# Patient Record
Sex: FEMALE | Race: WHITE | NOT HISPANIC OR LATINO | Employment: UNEMPLOYED | URBAN - NONMETROPOLITAN AREA
[De-identification: names, ages, dates, MRNs, and addresses within clinical notes are randomized per-mention and may not be internally consistent; named-entity substitution may affect disease eponyms.]

---

## 2020-11-11 ENCOUNTER — INITIAL PRENATAL (OUTPATIENT)
Dept: OBSTETRICS AND GYNECOLOGY | Facility: CLINIC | Age: 30
End: 2020-11-11

## 2020-11-11 ENCOUNTER — LAB (OUTPATIENT)
Dept: LAB | Facility: HOSPITAL | Age: 30
End: 2020-11-11

## 2020-11-11 VITALS
WEIGHT: 106 LBS | DIASTOLIC BLOOD PRESSURE: 56 MMHG | SYSTOLIC BLOOD PRESSURE: 102 MMHG | BODY MASS INDEX: 19.51 KG/M2 | HEIGHT: 62 IN

## 2020-11-11 DIAGNOSIS — Z32.00 PREGNANCY EXAMINATION OR TEST, PREGNANCY UNCONFIRMED: ICD-10-CM

## 2020-11-11 DIAGNOSIS — F11.29 OPIOID DEPENDENCE WITH OPIOID-INDUCED DISORDER (HCC): ICD-10-CM

## 2020-11-11 DIAGNOSIS — O99.331 MATERNAL TOBACCO USE IN FIRST TRIMESTER: ICD-10-CM

## 2020-11-11 DIAGNOSIS — O09.91 HIGH-RISK PREGNANCY IN FIRST TRIMESTER: Primary | ICD-10-CM

## 2020-11-11 DIAGNOSIS — Z36.87 ENCOUNTER FOR ANTENATAL SCREENING FOR UNCERTAIN DATES: ICD-10-CM

## 2020-11-11 DIAGNOSIS — Z82.79 FAMILY HISTORY OF CONGENITAL HEART DEFECT: ICD-10-CM

## 2020-11-11 DIAGNOSIS — O21.9 NAUSEA AND VOMITING DURING PREGNANCY PRIOR TO 22 WEEKS GESTATION: ICD-10-CM

## 2020-11-11 DIAGNOSIS — Z34.80 SUPERVISION OF OTHER NORMAL PREGNANCY: Primary | ICD-10-CM

## 2020-11-11 LAB
ABO GROUP BLD: NORMAL
AMPHET+METHAMPHET UR QL: NEGATIVE
AMPHETAMINES UR QL: NEGATIVE
ANTI-LEA: NORMAL
B-HCG UR QL: POSITIVE
BARBITURATES UR QL SCN: NEGATIVE
BASOPHILS # BLD AUTO: 0.06 10*3/MM3 (ref 0–0.2)
BASOPHILS NFR BLD AUTO: 0.6 % (ref 0–1.5)
BENZODIAZ UR QL SCN: NEGATIVE
BILIRUB UR QL STRIP: NEGATIVE
BLD GP AB SCN SERPL QL: POSITIVE
BUPRENORPHINE SERPL-MCNC: NEGATIVE NG/ML
CANNABINOIDS SERPL QL: POSITIVE
CLARITY UR: ABNORMAL
COCAINE UR QL: NEGATIVE
COLOR UR: YELLOW
DEPRECATED RDW RBC AUTO: 45.2 FL (ref 37–54)
EOSINOPHIL # BLD AUTO: 0.35 10*3/MM3 (ref 0–0.4)
EOSINOPHIL NFR BLD AUTO: 3.4 % (ref 0.3–6.2)
ERYTHROCYTE [DISTWIDTH] IN BLOOD BY AUTOMATED COUNT: 13.6 % (ref 12.3–15.4)
GLUCOSE UR STRIP-MCNC: NEGATIVE MG/DL
HBV SURFACE AG SERPL QL IA: NORMAL
HCT VFR BLD AUTO: 36.2 % (ref 34–46.6)
HCV AB SER DONR QL: REACTIVE
HGB BLD-MCNC: 12.4 G/DL (ref 12–15.9)
HGB UR QL STRIP.AUTO: NEGATIVE
HIV1+2 AB SER QL: NORMAL
IMM GRANULOCYTES # BLD AUTO: 0.04 10*3/MM3 (ref 0–0.05)
IMM GRANULOCYTES NFR BLD AUTO: 0.4 % (ref 0–0.5)
INTERNAL NEGATIVE CONTROL: NEGATIVE
INTERNAL POSITIVE CONTROL: POSITIVE
KETONES UR QL STRIP: NEGATIVE
LEUKOCYTE ESTERASE UR QL STRIP.AUTO: ABNORMAL
LYMPHOCYTES # BLD AUTO: 1.19 10*3/MM3 (ref 0.7–3.1)
LYMPHOCYTES NFR BLD AUTO: 11.7 % (ref 19.6–45.3)
Lab: ABNORMAL
Lab: NORMAL
MCH RBC QN AUTO: 30.9 PG (ref 26.6–33)
MCHC RBC AUTO-ENTMCNC: 34.3 G/DL (ref 31.5–35.7)
MCV RBC AUTO: 90.3 FL (ref 79–97)
METHADONE UR QL SCN: NEGATIVE
MONOCYTES # BLD AUTO: 0.85 10*3/MM3 (ref 0.1–0.9)
MONOCYTES NFR BLD AUTO: 8.4 % (ref 5–12)
NEUTROPHILS NFR BLD AUTO: 7.67 10*3/MM3 (ref 1.7–7)
NEUTROPHILS NFR BLD AUTO: 75.5 % (ref 42.7–76)
NITRITE UR QL STRIP: NEGATIVE
NRBC BLD AUTO-RTO: 0 /100 WBC (ref 0–0.2)
OPIATES UR QL: POSITIVE
OXYCODONE UR QL SCN: NEGATIVE
PCP UR QL SCN: NEGATIVE
PH UR STRIP.AUTO: 6.5 [PH] (ref 5–8)
PLATELET # BLD AUTO: 312 10*3/MM3 (ref 140–450)
PMV BLD AUTO: 10.3 FL (ref 6–12)
PROPOXYPH UR QL: NEGATIVE
PROT UR QL STRIP: NEGATIVE
RBC # BLD AUTO: 4.01 10*6/MM3 (ref 3.77–5.28)
RH BLD: POSITIVE
RPR SER QL: NORMAL
RUBV IGG SERPL IA-ACNC: NORMAL
SP GR UR STRIP: 1.01 (ref 1–1.03)
TRICYCLICS UR QL SCN: NEGATIVE
UROBILINOGEN UR QL STRIP: ABNORMAL
WBC # BLD AUTO: 10.16 10*3/MM3 (ref 3.4–10.8)

## 2020-11-11 PROCEDURE — 87491 CHLMYD TRACH DNA AMP PROBE: CPT | Performed by: NURSE PRACTITIONER

## 2020-11-11 PROCEDURE — 81003 URINALYSIS AUTO W/O SCOPE: CPT | Performed by: NURSE PRACTITIONER

## 2020-11-11 PROCEDURE — 86803 HEPATITIS C AB TEST: CPT | Performed by: NURSE PRACTITIONER

## 2020-11-11 PROCEDURE — 80081 OBSTETRIC PANEL INC HIV TSTG: CPT | Performed by: NURSE PRACTITIONER

## 2020-11-11 PROCEDURE — 87661 TRICHOMONAS VAGINALIS AMPLIF: CPT | Performed by: NURSE PRACTITIONER

## 2020-11-11 PROCEDURE — 86870 RBC ANTIBODY IDENTIFICATION: CPT | Performed by: NURSE PRACTITIONER

## 2020-11-11 PROCEDURE — 80306 DRUG TEST PRSMV INSTRMNT: CPT | Performed by: NURSE PRACTITIONER

## 2020-11-11 PROCEDURE — 87086 URINE CULTURE/COLONY COUNT: CPT | Performed by: NURSE PRACTITIONER

## 2020-11-11 PROCEDURE — 87522 HEPATITIS C REVRS TRNSCRPJ: CPT | Performed by: NURSE PRACTITIONER

## 2020-11-11 PROCEDURE — 99203 OFFICE O/P NEW LOW 30 MIN: CPT | Performed by: NURSE PRACTITIONER

## 2020-11-11 PROCEDURE — 81025 URINE PREGNANCY TEST: CPT | Performed by: NURSE PRACTITIONER

## 2020-11-11 PROCEDURE — 36415 COLL VENOUS BLD VENIPUNCTURE: CPT | Performed by: NURSE PRACTITIONER

## 2020-11-11 PROCEDURE — 87591 N.GONORRHOEAE DNA AMP PROB: CPT | Performed by: NURSE PRACTITIONER

## 2020-11-11 RX ORDER — PRENATAL VIT NO.126/IRON/FOLIC 28MG-0.8MG
1 TABLET ORAL DAILY
COMMUNITY

## 2020-11-11 RX ORDER — METOCLOPRAMIDE 10 MG/1
10 TABLET ORAL EVERY 6 HOURS PRN
Qty: 30 TABLET | Refills: 6 | Status: SHIPPED | OUTPATIENT
Start: 2020-11-11

## 2020-11-11 NOTE — PROGRESS NOTES
I spent approximately 60 minutes with the patient acquiring the health and history intake and discussing topics related to healthy lifestyle. Her LMP is approximately within the 1st week of September, but the patient is unsure. This is her 2nd pregnancy. She had a vaginal delivery in  at [facility]. She says she delivered about 32wks gestation. The baby was born with hypoplastic heart syndrome. She says her son had a couple of surgeries but then  at 4 months of age. She signed a release for us to get the records. A newob bag is given. The 1st trimester teaching was done with the patient. We discussed a healthy diet and exercise and what is recommended. I also discussed Listeriosis and Toxoplasmosis and what fish to avoid due to high mercury levels. I informed patient not to be in hot tubs, saunas, or tanning beds. We discussed that spotting may occur after intercourse which is common, but if heavy bleeding like a period occurs to call [facility] or hospital if clinic is closed.  I encouraged her to make an appointment with the dentist if she has not had a dental exam and cleaning in the last 6 months.  I instructed the patient that alcohol, illicit drug use, and tobacco smoking are not recommended in pregnancy. She says she is now smoking 1/2 pack of cigarettes a day and an E-cig. I encouraged her to quit smoking and not to be around secondhand smoke. She admits to an opiate addiction and wants to talk to the provider about that today. I gave her information for [facility] in [place]. I also told her that [facility] is located in [place]. She wants help with her addicion. She would like to breastfeed if she can.  I gave her pamphlet on breastfeeding classes and the breastfeeding mothers support group. These services are provided by [employer], Lactation Consultant. She filled out the health department referral form and depression screening questionnaire. She says she has a history of depression  and anxiety. She says she has seen a therapist at [facility] in the past. Since becoming pregnant, she says her depression and anxiety have gotten worse. I encouraged her to call and make an appointment at [facility]. She has a lot of anxiety since this is her second pregnancy and her baby  of heart problem. She is worried that this baby will have problems also. She is also worried because she is pregnant and has a drug addiction. Again, I encouraged her to call and make an appointment with a therapist.  I told the patient that group prenatal education classes are offered here. I instructed patient to let the provider know if she would like to join a group after EDC is established. A Centering Pregnancy pamphlet is given. I encouraged the patient to get the TDAP vaccine in the 3rd trimester.  I discussed with the patient that a pediatrician needs to be chosen prior to delivery for the infant to have an appointment scheduled before leaving the hospital.  I discussed lab tests will be done today. All questions were answered at this time. She is seeing Gracie REARDON.

## 2020-11-12 DIAGNOSIS — B19.20: Primary | ICD-10-CM

## 2020-11-12 PROBLEM — O09.91 HIGH-RISK PREGNANCY IN FIRST TRIMESTER: Status: ACTIVE | Noted: 2020-11-12

## 2020-11-12 PROBLEM — O99.331 MATERNAL TOBACCO USE IN FIRST TRIMESTER: Status: ACTIVE | Noted: 2020-11-12

## 2020-11-12 PROBLEM — O21.9 NAUSEA AND VOMITING DURING PREGNANCY PRIOR TO 22 WEEKS GESTATION: Status: ACTIVE | Noted: 2020-11-12

## 2020-11-12 PROBLEM — F11.29 OPIOID DEPENDENCE WITH OPIOID-INDUCED DISORDER (HCC): Status: ACTIVE | Noted: 2020-11-12

## 2020-11-12 PROBLEM — Z82.79 FAMILY HISTORY OF CONGENITAL HEART DEFECT: Status: ACTIVE | Noted: 2020-11-12

## 2020-11-12 LAB — BACTERIA SPEC AEROBE CULT: NO GROWTH

## 2020-11-12 NOTE — ASSESSMENT & PLAN NOTE
2010 Son was born at 32 weeks with hypoplastic left heart diagnosed at birth, had surgery and treatment, lived for 4 months

## 2020-11-12 NOTE — ASSESSMENT & PLAN NOTE
Pt reports she is currently taking dilaudid regularly.  She is interested in [employer].  Pt will call and make appointment with [employer].

## 2020-11-12 NOTE — PROGRESS NOTES
[facility]  Obstetrics Visit    CHIEF COMPLAINT:  New prenatal visit    HISTORY OF PRESENT ILLNESS:  Ana M He is a 30 y.o. y/o  at Unknown by LMP (No LMP recorded (lmp unknown). Patient is pregnant.).  This was not a planned pregnancy and the patient is supported by her significant other.  Reports frequent nausea with occasional vomiting.  Reports breast tenderness.  She denies any vaginal bleeding.  She has started taking a prenatal vitamin.    EPDS: 9    REVIEW OF SYSTEMS  Review of Systems   Constitutional: Positive for fatigue. Negative for activity change, appetite change, chills, diaphoresis, fever, unexpected weight gain and unexpected weight loss.   Respiratory: Negative for chest tightness and shortness of breath.    Cardiovascular: Negative for chest pain and palpitations.   Gastrointestinal: Positive for nausea and vomiting. Negative for abdominal distention, abdominal pain, anal bleeding, blood in stool, constipation, diarrhea, rectal pain, GERD and indigestion.   Genitourinary: Positive for frequency. Negative for amenorrhea, breast discharge, breast lump, breast pain, decreased libido, decreased urine volume, difficulty urinating, dyspareunia, dysuria, flank pain, genital sores, hematuria, pelvic pain, pelvic pressure, urgency, urinary incontinence, vaginal bleeding, vaginal discharge and vaginal pain.   Musculoskeletal: Negative for myalgias.   Skin: Negative for color change, dry skin and skin lesions.   Neurological: Positive for headache. Negative for light-headedness.   Psychiatric/Behavioral: Negative for agitation, dysphoric mood, sleep disturbance, suicidal ideas, depressed mood and stress. The patient is not nervous/anxious.        PRENATAL RISK FACTORS   Problems (from 20 to present)     Problem Noted Resolved    High-risk pregnancy in first trimester 2020 by Gracie Licona APRN No    Nausea and vomiting during pregnancy  "prior to 22 weeks gestation 2020 by Gracie Licona APRN No    Opioid dependence with opioid-induced disorder (CMS/HCC) 2020 by Gracie Licona APRN No    Maternal tobacco use in first trimester 2020 by Gracie Licona APRN No    Family history of congenital heart defect 2020 by Gracie Licona APRN No          DATING CRITERIA:  LMP \"around first week of September\"      OBSTETRIC HISTORY:  OB History    Para Term  AB Living   2 1   1       SAB TAB Ectopic Molar Multiple Live Births             1      # Outcome Date GA Lbr Sha/2nd Weight Sex Delivery Anes PTL Lv   2 Current            1  08   1871 g (4 lb 2 oz) M Vaginal Only EPI  DEC      Birth Comments: Baby born with hypoplastic heart syndrome;  at 4 months of age     GYN HISTORY:  Denies h/o sexually transmitted infections/pelvic inflammatory disease  Denies h/o abnormal pap smears  Last pap smear in 2018 at [facility], normal according to pt.  Release faxed.    Last pap smear:   Last Completed Pap Smear       Status Date      PAP SMEAR Done 9/10/2004 CONVERTED (HISTORICAL) GYN CYTOLOGY        Denies h/o gynecologic surgeries, including biopsies of the cervix    PAST MEDICAL HISTORY:  Past Medical History:   Diagnosis Date   • Anxiety    • Depression    • Substance abuse (CMS/HCC)     opiates     PAST SURGICAL HISTORY:  Past Surgical History:   Procedure Laterality Date   • WISDOM TOOTH EXTRACTION       FAMILY HISTORY:  Family History   Problem Relation Age of Onset   • Congenital heart disease Son    • Diabetes Father    • No Known Problems Mother    • No Known Problems Brother    • No Known Problems Sister    • No Known Problems Maternal Grandmother    • Lung cancer Maternal Grandfather    • No Known Problems Sister      SOCIAL HISTORY:  Social History     Socioeconomic History   • Marital status: Single     Spouse name: Not on file "   • Number of children: Not on file   • Years of education: Not on file   • Highest education level: Not on file   Tobacco Use   • Smoking status: Current Every Day Smoker     Packs/day: 0.50     Types: Cigarettes   • Smokeless tobacco: Never Used   • Tobacco comment: trying to quit   Substance and Sexual Activity   • Alcohol use: Not Currently   • Drug use: Yes     Comment: opiate addiction   • Sexual activity: Yes     Partners: Male     Comment[facility]     GENETIC SCREENING:  Age >34 yo as of BOB: no  Thalassemia: no  NTD: no  CHD: yes, son hypoplastic left heart   Down Syndrome/MR/Fragile X/Autism: no  Ashkenazi Amish with Charbel-Sachs, [place], familial dysautonomia: no  Sickle cell disease or trait: no  Hemophilia: no  Muscular dystrophy: no  Cystic fibrosis: no  Wallowa's chorea: no  Birth defects: no  Genetic/chromosomal disorders: no    INFECTION HISTORY:  TB exposure: no  HSV: no  Illness since LMP: no  Prior GBS infected child: no  STIs: no    ALLERGIES:  Allergies   Allergen Reactions   • Aspirin Hives, Shortness Of Breath and Swelling   • Latex Shortness Of Breath and Rash   • Sulfamethoxazole-Trimethoprim Anaphylaxis       MEDICATIONS:  Prior to Admission medications    Medication Sig Start Date End Date Taking? Authorizing Provider   IRON, FERROUS SULFATE, PO Take  by mouth.    Provider, MD Senait   metoclopramide (Reglan) 10 MG tablet Take 1 tablet by mouth Every 6 (Six) Hours As Needed (nausea and vomitting). 11/11/20   Gracie Licona APRN   prenatal vitamin (prenatal, CLASSIC, vitamin) tablet Take 1 tablet by mouth Daily.    Provider, MD Senait       PHYSICAL EXAM:   LMP  (LMP Unknown)   General: Alert, healthy, no distress, well nourished and well developed.  Neurologic: Alert, oriented to person, place, and time.  Gait normal.  Cranial nerves II-XII grossly intact.  HEENT: Normocephalic, atraumatic.  Extraocular muscles intact,  pupils equal and reactive x2.    Teeth: Normal hygiene.  Neck: Supple, no adenopathy, thyroid normal size, non-tender, without nodularity, trachea midline.  Breasts: No masses, skin dimpling, skin retraction, nipple discharge, or asymmetry bilaterally.  Lungs: Normal respiratory effort.  Clear to auscultation bilaterally.  No wheezes, rhonci, or rales.  Heart: Regular rate and rhythm.  No murmer, rub or gallop.  Abdomen: Soft, non-tender, non-distended,no masses, no hepatosplenomegaly, no hernia.  Skin: No rash, no lesions.  Extremities: No cyanosis, clubbing or edema.    Bedside ultrasound performed by myself which shows the findings below: +HHUS      IMPRESSION:  Ana M He is a 30 y.o.  at Unknown for a new prenatal visit.    PLAN:  1. NOB  - Options counseling performed and patient desires continuation of pregnancy to term   - Prenatal labs ordered  - Genetic testing, including cystic fibrosis, was discussed and patient desires DS screening with gender  - Continue prenatal vitamins  - Weight gain counseling performed.   - Pregravid BMI 18.5-24.9: Recommend 25-35 lb  - Return to clinic in 4 weeks for return prenatal visit and dating scan   - Reviewed COVID-19 visitation policy  - Reviewed COVID-19 precautions     Diagnosis Plan   1. High-risk pregnancy in first trimester     2. Nausea and vomiting during pregnancy prior to 22 weeks gestation  metoclopramide (Reglan) 10 MG tablet   3. Maternal tobacco use in first trimester  Educated on risks of smoking in pregnancy, strongly encouraged complete smoking cessation   4. Opioid dependence with opioid-induced disorder (CMS/HCC)  Discussed risks[facility]    5. Family history of congenital heart defect     6. Encounter for  screening for uncertain dates  US Ob < 14 Weeks Single or First Gestation     ALEXYS Rodriguez  2020  09:10 CST

## 2020-11-13 LAB
C TRACH RRNA SPEC QL NAA+PROBE: NEGATIVE
N GONORRHOEA RRNA SPEC QL NAA+PROBE: NEGATIVE
T VAGINALIS DNA SPEC QL NAA+PROBE: NEGATIVE

## 2020-11-15 LAB
HCV RNA SERPL NAA+PROBE-ACNC: NORMAL IU/ML
HCV RNA SERPL NAA+PROBE-LOG IU: 6.12 LOG10 IU/ML
TEST INFORMATION: NORMAL

## 2020-12-03 ENCOUNTER — ROUTINE PRENATAL (OUTPATIENT)
Dept: OBSTETRICS AND GYNECOLOGY | Facility: CLINIC | Age: 30
End: 2020-12-03

## 2020-12-03 VITALS — SYSTOLIC BLOOD PRESSURE: 100 MMHG | DIASTOLIC BLOOD PRESSURE: 60 MMHG | WEIGHT: 104 LBS | BODY MASS INDEX: 19.02 KG/M2

## 2020-12-03 DIAGNOSIS — O09.91 HIGH-RISK PREGNANCY IN FIRST TRIMESTER: ICD-10-CM

## 2020-12-03 DIAGNOSIS — Z82.79 FAMILY HISTORY OF CONGENITAL HEART DEFECT: ICD-10-CM

## 2020-12-03 DIAGNOSIS — O99.331 MATERNAL TOBACCO USE IN FIRST TRIMESTER: ICD-10-CM

## 2020-12-03 DIAGNOSIS — F11.29 OPIOID DEPENDENCE WITH OPIOID-INDUCED DISORDER (HCC): ICD-10-CM

## 2020-12-03 DIAGNOSIS — O21.9 NAUSEA AND VOMITING DURING PREGNANCY PRIOR TO 22 WEEKS GESTATION: ICD-10-CM

## 2020-12-03 DIAGNOSIS — Z3A.13 13 WEEKS GESTATION OF PREGNANCY: Primary | ICD-10-CM

## 2020-12-03 PROCEDURE — 90686 IIV4 VACC NO PRSV 0.5 ML IM: CPT | Performed by: OBSTETRICS & GYNECOLOGY

## 2020-12-03 PROCEDURE — 99213 OFFICE O/P EST LOW 20 MIN: CPT | Performed by: OBSTETRICS & GYNECOLOGY

## 2020-12-03 PROCEDURE — 90471 IMMUNIZATION ADMIN: CPT | Performed by: OBSTETRICS & GYNECOLOGY

## 2020-12-03 RX ORDER — ONDANSETRON 8 MG/1
8 TABLET, ORALLY DISINTEGRATING ORAL EVERY 8 HOURS PRN
Qty: 30 TABLET | Refills: 3 | Status: SHIPPED | OUTPATIENT
Start: 2020-12-03 | End: 2021-01-02

## 2020-12-03 NOTE — PROGRESS NOTES
CC: Prenatal visit    Ana M He is a 30 y.o.  at 13w3d.  Doing well.  Denies contractions, LOF, or VB.  Patient had a dating ultrasound done today with BOB of 2021.  Patient has history of previous baby with hypoplastic left heart that was delivered at 32 weeks but did not survive open heart surgeries.  Discussed NIPT testing with patient and she felt comfortable and would like to undergo this testing which I felt was appropriate given the history of fetal heart defects.  Explained to patient that I would recommend anatomy scan at 20 weeks and will likely need a fetal echo at 24 weeks.  And have patient return in 3 weeks for follow-up.  Anatomy scan at 20 weeks.  Patient desires flu shot today.  /60   Wt 47.2 kg (104 lb)   LMP  (LMP Unknown)   BMI 19.02 kg/m²        Fetal Heart Rate: 158     Problems (from 20 to present)     Problem Noted Resolved    High-risk pregnancy in first trimester 2020 by Gracie Licona APRN No    Nausea and vomiting during pregnancy prior to 22 weeks gestation 2020 by Gracie Licona APRN No    Opioid dependence with opioid-induced disorder (CMS/HCC) 2020 by Gracie Licona APRN No    Maternal tobacco use in first trimester 2020 by Gracie Licona APRN No    Family history of congenital heart defect 2020 by Gracie Licona APRN No          A/P: Ana M He is a 30 y.o.  at 13w3d.  Overall stable and doing well at this time.  Patient to return in 3 weeks, sooner as needed.  Bleeding cramping precautions given.  Flu shot to be given today NIPT testing to be drawn today.  Anatomy scan at 20 weeks.  - RTC in 3 weeks  - Reviewed COVID-19 visitation policy  - Reviewed COVID-19 precautions     Diagnosis Plan   1. 13 weeks gestation of pregnancy     2. Nausea and vomiting during pregnancy prior to 22 weeks gestation     3. Opioid dependence with  opioid-induced disorder (CMS/HCC)     4. High-risk pregnancy in first trimester     5. Maternal tobacco use in first trimester     6. Family history of congenital heart defect       Dangelo García DO  12/3/2020  14:25 CST

## 2020-12-10 ENCOUNTER — HOSPITAL ENCOUNTER (EMERGENCY)
Facility: HOSPITAL | Age: 30
Discharge: HOME | End: 2020-12-10
Attending: STUDENT IN AN ORGANIZED HEALTH CARE EDUCATION/TRAINING PROGRAM | Admitting: EMERGENCY MEDICINE

## 2020-12-10 ENCOUNTER — APPOINTMENT (OUTPATIENT)
Dept: GENERAL RADIOLOGY | Facility: HOSPITAL | Age: 30
End: 2020-12-10

## 2020-12-10 VITALS
OXYGEN SATURATION: 99 % | HEIGHT: 62 IN | BODY MASS INDEX: 21.16 KG/M2 | TEMPERATURE: 98.1 F | HEART RATE: 76 BPM | RESPIRATION RATE: 18 BRPM | SYSTOLIC BLOOD PRESSURE: 95 MMHG | WEIGHT: 115 LBS | DIASTOLIC BLOOD PRESSURE: 50 MMHG

## 2020-12-10 DIAGNOSIS — Z3A.14 14 WEEKS GESTATION OF PREGNANCY: ICD-10-CM

## 2020-12-10 DIAGNOSIS — R11.2 NON-INTRACTABLE VOMITING WITH NAUSEA, UNSPECIFIED VOMITING TYPE: Primary | ICD-10-CM

## 2020-12-10 DIAGNOSIS — R51.9 NONINTRACTABLE HEADACHE, UNSPECIFIED CHRONICITY PATTERN, UNSPECIFIED HEADACHE TYPE: ICD-10-CM

## 2020-12-10 LAB
ALBUMIN SERPL-MCNC: 3.7 G/DL (ref 3.5–5.2)
ALBUMIN/GLOB SERPL: 1.1 G/DL
ALP SERPL-CCNC: 132 U/L (ref 39–117)
ALT SERPL W P-5'-P-CCNC: 74 U/L (ref 1–33)
AMPHET+METHAMPHET UR QL: NEGATIVE
AMPHETAMINES UR QL: NEGATIVE
ANION GAP SERPL CALCULATED.3IONS-SCNC: 10 MMOL/L (ref 5–15)
AST SERPL-CCNC: 75 U/L (ref 1–32)
BACTERIA UR QL AUTO: ABNORMAL /HPF
BARBITURATES UR QL SCN: NEGATIVE
BASOPHILS # BLD AUTO: 0.05 10*3/MM3 (ref 0–0.2)
BASOPHILS NFR BLD AUTO: 0.5 % (ref 0–1.5)
BENZODIAZ UR QL SCN: NEGATIVE
BILIRUB SERPL-MCNC: 0.3 MG/DL (ref 0–1.2)
BILIRUB UR QL STRIP: NEGATIVE
BUN SERPL-MCNC: 8 MG/DL (ref 6–20)
BUN/CREAT SERPL: 15.7 (ref 7–25)
BUPRENORPHINE SERPL-MCNC: NEGATIVE NG/ML
CALCIUM SPEC-SCNC: 9 MG/DL (ref 8.6–10.5)
CANNABINOIDS SERPL QL: POSITIVE
CHLORIDE SERPL-SCNC: 100 MMOL/L (ref 98–107)
CLARITY UR: ABNORMAL
CO2 SERPL-SCNC: 24 MMOL/L (ref 22–29)
COCAINE UR QL: NEGATIVE
COLOR UR: YELLOW
CREAT SERPL-MCNC: 0.51 MG/DL (ref 0.57–1)
DEPRECATED RDW RBC AUTO: 41.1 FL (ref 37–54)
EOSINOPHIL # BLD AUTO: 0.45 10*3/MM3 (ref 0–0.4)
EOSINOPHIL NFR BLD AUTO: 4.9 % (ref 0.3–6.2)
ERYTHROCYTE [DISTWIDTH] IN BLOOD BY AUTOMATED COUNT: 12.9 % (ref 12.3–15.4)
GFR SERPL CREATININE-BSD FRML MDRD: 140 ML/MIN/1.73
GLOBULIN UR ELPH-MCNC: 3.4 GM/DL
GLUCOSE SERPL-MCNC: 125 MG/DL (ref 65–99)
GLUCOSE UR STRIP-MCNC: NEGATIVE MG/DL
HCT VFR BLD AUTO: 36 % (ref 34–46.6)
HGB BLD-MCNC: 13 G/DL (ref 12–15.9)
HGB UR QL STRIP.AUTO: NEGATIVE
HOLD SPECIMEN: NORMAL
HYALINE CASTS UR QL AUTO: ABNORMAL /LPF
IMM GRANULOCYTES # BLD AUTO: 0.06 10*3/MM3 (ref 0–0.05)
IMM GRANULOCYTES NFR BLD AUTO: 0.6 % (ref 0–0.5)
KETONES UR QL STRIP: NEGATIVE
LEUKOCYTE ESTERASE UR QL STRIP.AUTO: ABNORMAL
LYMPHOCYTES # BLD AUTO: 1.93 10*3/MM3 (ref 0.7–3.1)
LYMPHOCYTES NFR BLD AUTO: 20.9 % (ref 19.6–45.3)
MCH RBC QN AUTO: 31.8 PG (ref 26.6–33)
MCHC RBC AUTO-ENTMCNC: 36.1 G/DL (ref 31.5–35.7)
MCV RBC AUTO: 88 FL (ref 79–97)
METHADONE UR QL SCN: NEGATIVE
MONOCYTES # BLD AUTO: 0.33 10*3/MM3 (ref 0.1–0.9)
MONOCYTES NFR BLD AUTO: 3.6 % (ref 5–12)
NEUTROPHILS NFR BLD AUTO: 6.43 10*3/MM3 (ref 1.7–7)
NEUTROPHILS NFR BLD AUTO: 69.5 % (ref 42.7–76)
NITRITE UR QL STRIP: NEGATIVE
NRBC BLD AUTO-RTO: 0 /100 WBC (ref 0–0.2)
OPIATES UR QL: POSITIVE
OXYCODONE UR QL SCN: NEGATIVE
PCP UR QL SCN: NEGATIVE
PH UR STRIP.AUTO: 6.5 [PH] (ref 5–9)
PLATELET # BLD AUTO: 372 10*3/MM3 (ref 140–450)
PMV BLD AUTO: 9.4 FL (ref 6–12)
POTASSIUM SERPL-SCNC: 3.4 MMOL/L (ref 3.5–5.2)
PROPOXYPH UR QL: NEGATIVE
PROT SERPL-MCNC: 7.1 G/DL (ref 6–8.5)
PROT UR QL STRIP: NEGATIVE
RBC # BLD AUTO: 4.09 10*6/MM3 (ref 3.77–5.28)
RBC # UR: ABNORMAL /HPF
REF LAB TEST METHOD: ABNORMAL
SARS-COV-2 N GENE RESP QL NAA+PROBE: NOT DETECTED
SODIUM SERPL-SCNC: 134 MMOL/L (ref 136–145)
SP GR UR STRIP: 1.02 (ref 1–1.03)
SQUAMOUS #/AREA URNS HPF: ABNORMAL /HPF
TRICYCLICS UR QL SCN: NEGATIVE
UROBILINOGEN UR QL STRIP: ABNORMAL
WBC # BLD AUTO: 9.25 10*3/MM3 (ref 3.4–10.8)
WBC UR QL AUTO: ABNORMAL /HPF
WHOLE BLOOD HOLD SPECIMEN: NORMAL

## 2020-12-10 PROCEDURE — 93010 ELECTROCARDIOGRAM REPORT: CPT | Performed by: INTERNAL MEDICINE

## 2020-12-10 PROCEDURE — 85025 COMPLETE CBC W/AUTO DIFF WBC: CPT | Performed by: PHYSICIAN ASSISTANT

## 2020-12-10 PROCEDURE — 80053 COMPREHEN METABOLIC PANEL: CPT | Performed by: PHYSICIAN ASSISTANT

## 2020-12-10 PROCEDURE — 99284 EMERGENCY DEPT VISIT MOD MDM: CPT

## 2020-12-10 PROCEDURE — 80306 DRUG TEST PRSMV INSTRMNT: CPT | Performed by: PHYSICIAN ASSISTANT

## 2020-12-10 PROCEDURE — 87635 SARS-COV-2 COVID-19 AMP PRB: CPT | Performed by: PHYSICIAN ASSISTANT

## 2020-12-10 PROCEDURE — 96374 THER/PROPH/DIAG INJ IV PUSH: CPT

## 2020-12-10 PROCEDURE — 71045 X-RAY EXAM CHEST 1 VIEW: CPT

## 2020-12-10 PROCEDURE — C9803 HOPD COVID-19 SPEC COLLECT: HCPCS | Performed by: PHYSICIAN ASSISTANT

## 2020-12-10 PROCEDURE — 93005 ELECTROCARDIOGRAM TRACING: CPT | Performed by: PHYSICIAN ASSISTANT

## 2020-12-10 PROCEDURE — 81001 URINALYSIS AUTO W/SCOPE: CPT | Performed by: PHYSICIAN ASSISTANT

## 2020-12-10 PROCEDURE — 25010000002 ONDANSETRON PER 1 MG: Performed by: PHYSICIAN ASSISTANT

## 2020-12-10 PROCEDURE — 87086 URINE CULTURE/COLONY COUNT: CPT | Performed by: PHYSICIAN ASSISTANT

## 2020-12-10 RX ORDER — ONDANSETRON 2 MG/ML
4 INJECTION INTRAMUSCULAR; INTRAVENOUS ONCE
Status: COMPLETED | OUTPATIENT
Start: 2020-12-10 | End: 2020-12-10

## 2020-12-10 RX ORDER — SODIUM CHLORIDE 0.9 % (FLUSH) 0.9 %
10 SYRINGE (ML) INJECTION AS NEEDED
Status: DISCONTINUED | OUTPATIENT
Start: 2020-12-10 | End: 2020-12-10 | Stop reason: HOSPADM

## 2020-12-10 RX ADMIN — SODIUM CHLORIDE 1000 ML: 9 INJECTION, SOLUTION INTRAVENOUS at 15:48

## 2020-12-10 RX ADMIN — ONDANSETRON 4 MG: 2 SOLUTION INTRAMUSCULAR; INTRAVENOUS at 15:23

## 2020-12-10 NOTE — ED NOTES
Patient here for nausea and vomiting for 3 days.  Patient is 14 weeks pregnant.      Zayda Andrade, RN  12/10/20 6764     No

## 2020-12-10 NOTE — ED PROVIDER NOTES
Subjective   Patient presents to emergency department for headache, chills, shortness of breath, nausea and vomiting.  She is approximately 14 weeks pregnant.  States she has had nausea and vomiting throughout her pregnancy.  Headache, chills and shortness of breath started 3 days ago.  Denies any vaginal bleeding/discharge, pelvic pain, urinary symptoms.      History provided by:  Patient   used: No    Shortness of Breath  Associated symptoms: fever, headaches and vomiting    Associated symptoms: no abdominal pain, no chest pain, no sore throat and no wheezing    Nausea  The primary symptoms include fever, nausea and vomiting. Primary symptoms do not include abdominal pain or dysuria.   The illness does not include chills or back pain.   Vomiting  The primary symptoms include fever, nausea and vomiting. Primary symptoms do not include abdominal pain or dysuria.   The illness does not include chills or back pain.   Fever  Associated symptoms: headaches, nausea and vomiting    Associated symptoms: no chest pain, no chills, no confusion, no dysuria and no sore throat    Headache  Associated symptoms: fever, nausea and vomiting    Associated symptoms: no abdominal pain, no back pain, no sore throat and no weakness        Review of Systems   Constitutional: Positive for fever. Negative for chills.   HENT: Negative for sore throat and trouble swallowing.    Eyes: Negative for visual disturbance.   Respiratory: Positive for shortness of breath. Negative for wheezing.    Cardiovascular: Negative for chest pain.   Gastrointestinal: Positive for nausea and vomiting. Negative for abdominal pain.   Genitourinary: Negative for dysuria and flank pain.   Musculoskeletal: Negative for back pain.   Skin: Negative for color change.   Allergic/Immunologic: Negative for immunocompromised state.   Neurological: Positive for headaches. Negative for syncope and weakness.   Psychiatric/Behavioral: Negative for  "confusion.       Past Medical History:   Diagnosis Date    Anxiety     Depression     Substance abuse (CMS/[facility])     opiates       Allergies   Allergen Reactions    Aspirin Hives, Shortness Of Breath and Swelling    Latex Shortness Of Breath and Rash    Sulfamethoxazole-Trimethoprim Anaphylaxis       Past Surgical History:   Procedure Laterality Date    WISDOM TOOTH EXTRACTION         Family History   Problem Relation Age of Onset    Congenital heart disease Son     Diabetes Father     No Known Problems Mother     No Known Problems Brother     No Known Problems Sister     No Known Problems Maternal Grandmother     Lung cancer Maternal Grandfather     No Known Problems Sister        Social History     Socioeconomic History    Marital status:      Spouse name: Not on file    Number of children: Not on file    Years of education: Not on file    Highest education level: Not on file   Tobacco Use    Smoking status: Current Every Day Smoker     Packs/day: 0.50     Types: Cigarettes    Smokeless tobacco: Never Used    Tobacco comment: trying to quit   Substance and Sexual Activity    Alcohol use: Not Currently    Drug use: Not Currently     Types: IV    Sexual activity: Yes     Partners: Male     Comment: last pap smear 2 years ago at [facility] -release signed           Objective      BP 95/50 (BP Location: Left arm, Patient Position: Sitting)   Pulse 76   Temp 98.1 °F (36.7 °C) (Tympanic)   Resp 18   Ht 157.5 cm (62\")   Wt 52.2 kg (115 lb)   LMP  (LMP Unknown)   SpO2 99%   BMI 21.03 kg/m²     Physical Exam  Vitals signs and nursing note reviewed.   Constitutional:       Appearance: She is well-developed.   HENT:      Head: Normocephalic and atraumatic.      Mouth/Throat:      Pharynx: Oropharynx is clear.   Eyes:      Conjunctiva/sclera: Conjunctivae normal.   Cardiovascular:      Rate and Rhythm: Normal rate and regular rhythm.      Pulses: Normal pulses.      Heart sounds: Normal heart sounds.   Pulmonary:      " Effort: Pulmonary effort is normal.      Breath sounds: Normal breath sounds.   Abdominal:      General: Abdomen is flat. Bowel sounds are normal. There is no distension.      Tenderness: There is no abdominal tenderness.      Comments: Gravid uterus     Skin:     General: Skin is warm.      Capillary Refill: Capillary refill takes less than 2 seconds.   Neurological:      General: No focal deficit present.      Mental Status: She is alert.   Psychiatric:         Mood and Affect: Mood normal.         Behavior: Behavior normal.         Thought Content: Thought content normal.         ECG 12 Lead      Date/Time: 12/10/2020 5:10 PM  Performed by: Jose R Villalobos PA-C  Authorized by: Jose R Villalobos PA-C   Interpreted by physician  Comparison: not compared with previous ECG   Previous ECG: no previous ECG available  Rhythm: sinus rhythm  Rate: normal  BPM: 74  ST Segments: ST segments normal  Clinical impression: normal ECG                 ED Course      Results for orders placed or performed during the hospital encounter of 12/10/20   COVID-19, [facility] IN-HOUSE, NP SWAB IN TRANSPORT MEDIA 8-10 HR TAT - Swab, Nasopharynx    Specimen: Nasopharynx; Swab   Result Value Ref Range    COVID19 Not Detected Not Detected - Ref. Range   Urine Culture - Urine, Urine, Clean Catch    Specimen: Urine, Clean Catch   Result Value Ref Range    Urine Culture <25,000 CFU/mL Mixed Lianne Isolated    Comprehensive Metabolic Panel    Specimen: Blood   Result Value Ref Range    Glucose 125 (H) 65 - 99 mg/dL    BUN 8 6 - 20 mg/dL    Creatinine 0.51 (L) 0.57 - 1.00 mg/dL    Sodium 134 (L) 136 - 145 mmol/L    Potassium 3.4 (L) 3.5 - 5.2 mmol/L    Chloride 100 98 - 107 mmol/L    CO2 24.0 22.0 - 29.0 mmol/L    Calcium 9.0 8.6 - 10.5 mg/dL    Total Protein 7.1 6.0 - 8.5 g/dL    Albumin 3.70 3.50 - 5.20 g/dL    ALT (SGPT) 74 (H) 1 - 33 U/L    AST (SGOT) 75 (H) 1 - 32 U/L    Alkaline Phosphatase 132 (H) 39 - 117 U/L    Total Bilirubin 0.3  0.0 - 1.2 mg/dL    eGFR Non African Amer 140 >60 mL/min/1.73    Globulin 3.4 gm/dL    A/G Ratio 1.1 g/dL    BUN/Creatinine Ratio 15.7 7.0 - 25.0    Anion Gap 10.0 5.0 - 15.0 mmol/L   CBC Auto Differential    Specimen: Blood   Result Value Ref Range    WBC 9.25 3.40 - 10.80 10*3/mm3    RBC 4.09 3.77 - 5.28 10*6/mm3    Hemoglobin 13.0 12.0 - 15.9 g/dL    Hematocrit 36.0 34.0 - 46.6 %    MCV 88.0 79.0 - 97.0 fL    MCH 31.8 26.6 - 33.0 pg    MCHC 36.1 (H) 31.5 - 35.7 g/dL    RDW 12.9 12.3 - 15.4 %    RDW-SD 41.1 37.0 - 54.0 fl    MPV 9.4 6.0 - 12.0 fL    Platelets 372 140 - 450 10*3/mm3    Neutrophil % 69.5 42.7 - 76.0 %    Lymphocyte % 20.9 19.6 - 45.3 %    Monocyte % 3.6 (L) 5.0 - 12.0 %    Eosinophil % 4.9 0.3 - 6.2 %    Basophil % 0.5 0.0 - 1.5 %    Immature Grans % 0.6 (H) 0.0 - 0.5 %    Neutrophils, Absolute 6.43 1.70 - 7.00 10*3/mm3    Lymphocytes, Absolute 1.93 0.70 - 3.10 10*3/mm3    Monocytes, Absolute 0.33 0.10 - 0.90 10*3/mm3    Eosinophils, Absolute 0.45 (H) 0.00 - 0.40 10*3/mm3    Basophils, Absolute 0.05 0.00 - 0.20 10*3/mm3    Immature Grans, Absolute 0.06 (H) 0.00 - 0.05 10*3/mm3    nRBC 0.0 0.0 - 0.2 /100 WBC   Urine Drug Screen - Urine, Clean Catch    Specimen: Urine, Clean Catch   Result Value Ref Range    THC, Screen, Urine Positive (A) Negative    Phencyclidine (PCP), Urine Negative Negative    Cocaine Screen, Urine Negative Negative    Methamphetamine, Ur Negative Negative    Opiate Screen Positive (A) Negative    Amphetamine Screen, Urine Negative Negative    Benzodiazepine Screen, Urine Negative Negative    Tricyclic Antidepressants Screen Negative Negative    Methadone Screen, Urine Negative Negative    Barbiturates Screen, Urine Negative Negative    Oxycodone Screen, Urine Negative Negative    Propoxyphene Screen Negative Negative    Buprenorphine, Screen, Urine Negative Negative   Urinalysis With Culture If Indicated - Urine, Clean Catch    Specimen: Urine, Clean Catch   Result Value Ref  Range    Color, UA Yellow Yellow, Straw, Dark Yellow, [place]    Appearance, UA Cloudy (A) Clear    pH, UA 6.5 5.0 - 9.0    Specific Gravity, UA 1.018 1.003 - 1.030    Glucose, UA Negative Negative    Ketones, UA Negative Negative    Bilirubin, UA Negative Negative    Blood, UA Negative Negative    Protein, UA Negative Negative    Leuk Esterase, UA Moderate (2+) (A) Negative    Nitrite, UA Negative Negative    Urobilinogen, UA 1.0 E.U./dL 0.2 - 1.0 E.U./dL   Urinalysis, Microscopic Only - Urine, Clean Catch    Specimen: Urine, Clean Catch   Result Value Ref Range    RBC, UA 0-2 (A) None Seen /HPF    WBC, UA 6-12 (A) None Seen, 0-2, 3-5 /HPF    Bacteria, UA 1+ (A) None Seen /HPF    Squamous Epithelial Cells, UA 3-5 (A) None Seen, 0-2 /HPF    Hyaline Casts, UA 3-6 None Seen /LPF    Methodology Automated Microscopy    ECG 12 Lead   Result Value Ref Range    QT Interval 372 ms    QTC Interval 412 ms   Light Blue Top   Result Value Ref Range    Extra Tube hold for add-on    Gold Top - SST   Result Value Ref Range    Extra Tube Hold for add-ons.      Xr Chest 1 View    Result Date: 12/10/2020  Narrative: Chest x-ray single view. CLINICAL INDICATION: Shortness of breath. Tachycardia. COMPARISON: None FINDINGS: Cardiac silhouette is normal in size. Pulmonary vascularity is unremarkable. No focal infiltrate or consolidation.  No pleural effusion.  No pneumothorax.     Impression: No evidence of active disease. Electronically signed by:  Manuel Bailey MD  12/10/2020 3:45 PM CST Workstation: [id number]    Discussed results with patient.  Gave educational materials.  Advised close follow up with OB.  Return to emergency department for new or worsening symptoms.                                       PERC Rule (for pulmonary embolism) reviewed and/or performed as part of the patient evaluation and treatment planning process.  The result associated with this review/performance is: 0       MDM    Final diagnoses:   Non-intractable  vomiting with nausea, unspecified vomiting type   Nonintractable headache, unspecified chronicity pattern, unspecified headache type   14 weeks gestation of pregnancy            Jose R Villalobos PA-C  12/10/20 1711       Jose R Villalobos PA-C  12/18/20 1134       Jose R Villalobos PA-C  12/21/20 1154

## 2020-12-11 ENCOUNTER — TELEPHONE (OUTPATIENT)
Dept: OTHER | Facility: HOSPITAL | Age: 30
End: 2020-12-11

## 2020-12-11 LAB — BACTERIA SPEC AEROBE CULT: NORMAL

## 2020-12-14 DIAGNOSIS — Z36.87 ENCOUNTER FOR ANTENATAL SCREENING FOR UNCERTAIN DATES: ICD-10-CM

## 2020-12-21 LAB
QT INTERVAL: 372 MS
QTC INTERVAL: 412 MS

## 2021-05-12 ENCOUNTER — ANESTHESIA EVENT (OUTPATIENT)
Dept: LABOR AND DELIVERY | Facility: HOSPITAL | Age: 31
End: 2021-05-12

## 2021-05-12 ENCOUNTER — HOSPITAL ENCOUNTER (INPATIENT)
Facility: HOSPITAL | Age: 31
LOS: 1 days | Discharge: HOME | End: 2021-05-13
Attending: OBSTETRICS & GYNECOLOGY | Admitting: OBSTETRICS & GYNECOLOGY

## 2021-05-12 ENCOUNTER — ANESTHESIA (OUTPATIENT)
Dept: LABOR AND DELIVERY | Facility: HOSPITAL | Age: 31
End: 2021-05-12

## 2021-05-12 PROBLEM — O60.00 PRETERM LABOR: Status: ACTIVE | Noted: 2021-05-12

## 2021-05-12 LAB
A1 MICROGLOB PLACENTAL VAG QL: POSITIVE
ABO GROUP BLD: NORMAL
ALBUMIN SERPL-MCNC: 3 G/DL (ref 3.5–5.2)
ALBUMIN/GLOB SERPL: 0.9 G/DL
ALP SERPL-CCNC: 346 U/L (ref 39–117)
ALT SERPL W P-5'-P-CCNC: 17 U/L (ref 1–33)
AMPHET+METHAMPHET UR QL: NEGATIVE
AMPHETAMINES UR QL: NEGATIVE
ANION GAP SERPL CALCULATED.3IONS-SCNC: 7 MMOL/L (ref 5–15)
AST SERPL-CCNC: 32 U/L (ref 1–32)
BACTERIA UR QL AUTO: ABNORMAL /HPF
BARBITURATES UR QL SCN: NEGATIVE
BENZODIAZ UR QL SCN: NEGATIVE
BILIRUB SERPL-MCNC: 0.2 MG/DL (ref 0–1.2)
BILIRUB UR QL STRIP: NEGATIVE
BLD GP AB SCN SERPL QL: POSITIVE
BUN SERPL-MCNC: 13 MG/DL (ref 6–20)
BUN/CREAT SERPL: 17.8 (ref 7–25)
BUPRENORPHINE SERPL-MCNC: NEGATIVE NG/ML
CALCIUM SPEC-SCNC: 8.8 MG/DL (ref 8.6–10.5)
CANNABINOIDS SERPL QL: NEGATIVE
CHLORIDE SERPL-SCNC: 103 MMOL/L (ref 98–107)
CLARITY UR: ABNORMAL
CO2 SERPL-SCNC: 24 MMOL/L (ref 22–29)
COCAINE UR QL: NEGATIVE
COLOR UR: YELLOW
CREAT SERPL-MCNC: 0.73 MG/DL (ref 0.57–1)
DEPRECATED RDW RBC AUTO: 39.2 FL (ref 37–54)
ERYTHROCYTE [DISTWIDTH] IN BLOOD BY AUTOMATED COUNT: 14.1 % (ref 12.3–15.4)
GFR SERPL CREATININE-BSD FRML MDRD: 92 ML/MIN/1.73
GLOBULIN UR ELPH-MCNC: 3.2 GM/DL
GLUCOSE SERPL-MCNC: 84 MG/DL (ref 65–99)
GLUCOSE UR STRIP-MCNC: NEGATIVE MG/DL
HCT VFR BLD AUTO: 35.6 % (ref 34–46.6)
HGB BLD-MCNC: 11.7 G/DL (ref 12–15.9)
HGB UR QL STRIP.AUTO: ABNORMAL
HOLD SPECIMEN: NORMAL
HYALINE CASTS UR QL AUTO: ABNORMAL /LPF
KETONES UR QL STRIP: NEGATIVE
LEUKOCYTE ESTERASE UR QL STRIP.AUTO: ABNORMAL
Lab: NORMAL
MCH RBC QN AUTO: 25.6 PG (ref 26.6–33)
MCHC RBC AUTO-ENTMCNC: 32.9 G/DL (ref 31.5–35.7)
MCV RBC AUTO: 77.9 FL (ref 79–97)
METHADONE UR QL SCN: POSITIVE
NITRITE UR QL STRIP: NEGATIVE
OPIATES UR QL: POSITIVE
OXYCODONE UR QL SCN: NEGATIVE
PCP UR QL SCN: NEGATIVE
PH UR STRIP.AUTO: 7.5 [PH] (ref 5–9)
PLATELET # BLD AUTO: 443 10*3/MM3 (ref 140–450)
PMV BLD AUTO: 10 FL (ref 6–12)
POTASSIUM SERPL-SCNC: 4.1 MMOL/L (ref 3.5–5.2)
PROPOXYPH UR QL: NEGATIVE
PROT SERPL-MCNC: 6.2 G/DL (ref 6–8.5)
PROT UR QL STRIP: ABNORMAL
RBC # BLD AUTO: 4.57 10*6/MM3 (ref 3.77–5.28)
RBC # UR: ABNORMAL /HPF
REF LAB TEST METHOD: ABNORMAL
RH BLD: POSITIVE
SODIUM SERPL-SCNC: 134 MMOL/L (ref 136–145)
SP GR UR STRIP: 1.01 (ref 1–1.03)
SQUAMOUS #/AREA URNS HPF: ABNORMAL /HPF
T&S EXPIRATION DATE: NORMAL
TRICYCLICS UR QL SCN: NEGATIVE
UROBILINOGEN UR QL STRIP: ABNORMAL
WBC # BLD AUTO: 12.64 10*3/MM3 (ref 3.4–10.8)
WBC UR QL AUTO: ABNORMAL /HPF
WHOLE BLOOD HOLD SPECIMEN: NORMAL

## 2021-05-12 PROCEDURE — 86900 BLOOD TYPING SEROLOGIC ABO: CPT | Performed by: OBSTETRICS & GYNECOLOGY

## 2021-05-12 PROCEDURE — 88307 TISSUE EXAM BY PATHOLOGIST: CPT

## 2021-05-12 PROCEDURE — 25010000003 LIDOCAINE 1 % SOLUTION: Performed by: NURSE ANESTHETIST, CERTIFIED REGISTERED

## 2021-05-12 PROCEDURE — 86870 RBC ANTIBODY IDENTIFICATION: CPT | Performed by: OBSTETRICS & GYNECOLOGY

## 2021-05-12 PROCEDURE — 36415 COLL VENOUS BLD VENIPUNCTURE: CPT | Performed by: OBSTETRICS & GYNECOLOGY

## 2021-05-12 PROCEDURE — 51703 INSERT BLADDER CATH COMPLEX: CPT

## 2021-05-12 PROCEDURE — 86901 BLOOD TYPING SEROLOGIC RH(D): CPT | Performed by: OBSTETRICS & GYNECOLOGY

## 2021-05-12 PROCEDURE — G0480 DRUG TEST DEF 1-7 CLASSES: HCPCS

## 2021-05-12 PROCEDURE — 59410 OBSTETRICAL CARE: CPT | Performed by: OBSTETRICS & GYNECOLOGY

## 2021-05-12 PROCEDURE — C1755 CATHETER, INTRASPINAL: HCPCS

## 2021-05-12 PROCEDURE — 80053 COMPREHEN METABOLIC PANEL: CPT | Performed by: OBSTETRICS & GYNECOLOGY

## 2021-05-12 PROCEDURE — C1755 CATHETER, INTRASPINAL: HCPCS | Performed by: NURSE ANESTHETIST, CERTIFIED REGISTERED

## 2021-05-12 PROCEDURE — 85027 COMPLETE CBC AUTOMATED: CPT | Performed by: OBSTETRICS & GYNECOLOGY

## 2021-05-12 PROCEDURE — 81001 URINALYSIS AUTO W/SCOPE: CPT | Performed by: OBSTETRICS & GYNECOLOGY

## 2021-05-12 PROCEDURE — S0260 H&P FOR SURGERY: HCPCS | Performed by: OBSTETRICS & GYNECOLOGY

## 2021-05-12 PROCEDURE — 51702 INSERT TEMP BLADDER CATH: CPT

## 2021-05-12 PROCEDURE — 0UQMXZZ REPAIR VULVA, EXTERNAL APPROACH: ICD-10-PCS | Performed by: OBSTETRICS & GYNECOLOGY

## 2021-05-12 PROCEDURE — 84112 EVAL AMNIOTIC FLUID PROTEIN: CPT | Performed by: OBSTETRICS & GYNECOLOGY

## 2021-05-12 PROCEDURE — 86850 RBC ANTIBODY SCREEN: CPT | Performed by: OBSTETRICS & GYNECOLOGY

## 2021-05-12 PROCEDURE — 6A550ZT PHERESIS OF CORD BLOOD STEM CELLS, SINGLE: ICD-10-PCS | Performed by: OBSTETRICS & GYNECOLOGY

## 2021-05-12 PROCEDURE — 25010000003 PENICILLIN G POTASSIUM PER 600000 UNITS: Performed by: OBSTETRICS & GYNECOLOGY

## 2021-05-12 PROCEDURE — 80306 DRUG TEST PRSMV INSTRMNT: CPT | Performed by: OBSTETRICS & GYNECOLOGY

## 2021-05-12 RX ORDER — LIDOCAINE HYDROCHLORIDE 10 MG/ML
INJECTION, SOLUTION EPIDURAL; INFILTRATION; INTRACAUDAL; PERINEURAL
Status: DISCONTINUED
Start: 2021-05-12 | End: 2021-05-12 | Stop reason: WASHOUT

## 2021-05-12 RX ORDER — MISOPROSTOL 200 UG/1
600 TABLET ORAL ONCE
Status: DISCONTINUED | OUTPATIENT
Start: 2021-05-12 | End: 2021-05-13 | Stop reason: HOSPADM

## 2021-05-12 RX ORDER — OXYTOCIN/0.9 % SODIUM CHLORIDE 30/500 ML
PLASTIC BAG, INJECTION (ML) INTRAVENOUS
Status: COMPLETED
Start: 2021-05-12 | End: 2021-05-12

## 2021-05-12 RX ORDER — METHADONE HYDROCHLORIDE 10 MG/1
60 TABLET ORAL EVERY 6 HOURS PRN
COMMUNITY

## 2021-05-12 RX ORDER — ACETAMINOPHEN 325 MG/1
650 TABLET ORAL EVERY 4 HOURS PRN
Status: DISCONTINUED | OUTPATIENT
Start: 2021-05-12 | End: 2021-05-13 | Stop reason: HOSPADM

## 2021-05-12 RX ORDER — OXYTOCIN/0.9 % SODIUM CHLORIDE 30/500 ML
85 PLASTIC BAG, INJECTION (ML) INTRAVENOUS ONCE
Status: DISCONTINUED | OUTPATIENT
Start: 2021-05-12 | End: 2021-05-12 | Stop reason: SDUPTHER

## 2021-05-12 RX ORDER — MISOPROSTOL 200 UG/1
800 TABLET ORAL AS NEEDED
Status: DISCONTINUED | OUTPATIENT
Start: 2021-05-12 | End: 2021-05-13 | Stop reason: HOSPADM

## 2021-05-12 RX ORDER — LIDOCAINE HYDROCHLORIDE AND EPINEPHRINE 15; 5 MG/ML; UG/ML
INJECTION, SOLUTION EPIDURAL AS NEEDED
Status: DISCONTINUED | OUTPATIENT
Start: 2021-05-12 | End: 2021-05-12 | Stop reason: SURG

## 2021-05-12 RX ORDER — OXYTOCIN/0.9 % SODIUM CHLORIDE 30/500 ML
2-20 PLASTIC BAG, INJECTION (ML) INTRAVENOUS
Status: DISCONTINUED | OUTPATIENT
Start: 2021-05-12 | End: 2021-05-12 | Stop reason: SDUPTHER

## 2021-05-12 RX ORDER — PROMETHAZINE HYDROCHLORIDE 12.5 MG/1
12.5 SUPPOSITORY RECTAL EVERY 6 HOURS PRN
Status: DISCONTINUED | OUTPATIENT
Start: 2021-05-12 | End: 2021-05-13 | Stop reason: HOSPADM

## 2021-05-12 RX ORDER — SODIUM CHLORIDE 0.9 % (FLUSH) 0.9 %
10 SYRINGE (ML) INJECTION AS NEEDED
Status: DISCONTINUED | OUTPATIENT
Start: 2021-05-12 | End: 2021-05-13 | Stop reason: HOSPADM

## 2021-05-12 RX ORDER — CARBOPROST TROMETHAMINE 250 UG/ML
250 INJECTION, SOLUTION INTRAMUSCULAR AS NEEDED
Status: DISCONTINUED | OUTPATIENT
Start: 2021-05-12 | End: 2021-05-13 | Stop reason: HOSPADM

## 2021-05-12 RX ORDER — DEXTROSE, SODIUM CHLORIDE, SODIUM LACTATE, POTASSIUM CHLORIDE, AND CALCIUM CHLORIDE 5; .6; .31; .03; .02 G/100ML; G/100ML; G/100ML; G/100ML; G/100ML
125 INJECTION, SOLUTION INTRAVENOUS CONTINUOUS
Status: DISCONTINUED | OUTPATIENT
Start: 2021-05-12 | End: 2021-05-13 | Stop reason: HOSPADM

## 2021-05-12 RX ORDER — METHADONE HYDROCHLORIDE 10 MG/1
60 TABLET ORAL DAILY
Status: ON HOLD | COMMUNITY
End: 2021-05-12

## 2021-05-12 RX ORDER — OXYTOCIN/0.9 % SODIUM CHLORIDE 30/500 ML
650 PLASTIC BAG, INJECTION (ML) INTRAVENOUS ONCE
Status: DISCONTINUED | OUTPATIENT
Start: 2021-05-12 | End: 2021-05-12 | Stop reason: SDUPTHER

## 2021-05-12 RX ORDER — LIDOCAINE HYDROCHLORIDE 10 MG/ML
5 INJECTION, SOLUTION EPIDURAL; INFILTRATION; INTRACAUDAL; PERINEURAL AS NEEDED
Status: DISCONTINUED | OUTPATIENT
Start: 2021-05-12 | End: 2021-05-13 | Stop reason: HOSPADM

## 2021-05-12 RX ORDER — SODIUM CHLORIDE, SODIUM LACTATE, POTASSIUM CHLORIDE, CALCIUM CHLORIDE 600; 310; 30; 20 MG/100ML; MG/100ML; MG/100ML; MG/100ML
125 INJECTION, SOLUTION INTRAVENOUS CONTINUOUS
Status: DISCONTINUED | OUTPATIENT
Start: 2021-05-12 | End: 2021-05-13 | Stop reason: HOSPADM

## 2021-05-12 RX ORDER — FERROUS SULFATE 325(65) MG
325 TABLET ORAL
COMMUNITY

## 2021-05-12 RX ORDER — PROMETHAZINE HYDROCHLORIDE 12.5 MG/1
12.5 TABLET ORAL EVERY 6 HOURS PRN
Status: DISCONTINUED | OUTPATIENT
Start: 2021-05-12 | End: 2021-05-13 | Stop reason: HOSPADM

## 2021-05-12 RX ORDER — CARBOPROST TROMETHAMINE 250 UG/ML
250 INJECTION, SOLUTION INTRAMUSCULAR ONCE
Status: DISCONTINUED | OUTPATIENT
Start: 2021-05-12 | End: 2021-05-13 | Stop reason: HOSPADM

## 2021-05-12 RX ORDER — HYDROCODONE BITARTRATE AND ACETAMINOPHEN 7.5; 325 MG/1; MG/1
1 TABLET ORAL EVERY 4 HOURS PRN
Status: DISCONTINUED | OUTPATIENT
Start: 2021-05-12 | End: 2021-05-13 | Stop reason: HOSPADM

## 2021-05-12 RX ORDER — HYDROXYZINE PAMOATE 25 MG/1
25 CAPSULE ORAL ONCE
Status: COMPLETED | OUTPATIENT
Start: 2021-05-12 | End: 2021-05-12

## 2021-05-12 RX ORDER — METHYLERGONOVINE MALEATE 0.2 MG/ML
200 INJECTION INTRAVENOUS ONCE AS NEEDED
Status: DISCONTINUED | OUTPATIENT
Start: 2021-05-12 | End: 2021-05-13 | Stop reason: HOSPADM

## 2021-05-12 RX ORDER — OXYTOCIN/0.9 % SODIUM CHLORIDE 30/500 ML
650 PLASTIC BAG, INJECTION (ML) INTRAVENOUS ONCE
Status: COMPLETED | OUTPATIENT
Start: 2021-05-12 | End: 2021-05-12

## 2021-05-12 RX ORDER — SODIUM CHLORIDE 0.9 % (FLUSH) 0.9 %
3 SYRINGE (ML) INJECTION EVERY 12 HOURS SCHEDULED
Status: DISCONTINUED | OUTPATIENT
Start: 2021-05-12 | End: 2021-05-13 | Stop reason: HOSPADM

## 2021-05-12 RX ORDER — BUTORPHANOL TARTRATE 1 MG/ML
1 INJECTION, SOLUTION INTRAMUSCULAR; INTRAVENOUS
Status: DISCONTINUED | OUTPATIENT
Start: 2021-05-12 | End: 2021-05-13

## 2021-05-12 RX ORDER — SODIUM CHLORIDE 0.9 % (FLUSH) 0.9 %
3-10 SYRINGE (ML) INJECTION AS NEEDED
Status: DISCONTINUED | OUTPATIENT
Start: 2021-05-12 | End: 2021-05-13 | Stop reason: HOSPADM

## 2021-05-12 RX ORDER — LIDOCAINE HYDROCHLORIDE 10 MG/ML
INJECTION, SOLUTION INFILTRATION; PERINEURAL AS NEEDED
Status: DISCONTINUED | OUTPATIENT
Start: 2021-05-12 | End: 2021-05-12 | Stop reason: SURG

## 2021-05-12 RX ORDER — SODIUM CHLORIDE 0.9 % (FLUSH) 0.9 %
10 SYRINGE (ML) INJECTION EVERY 12 HOURS SCHEDULED
Status: DISCONTINUED | OUTPATIENT
Start: 2021-05-12 | End: 2021-05-13 | Stop reason: HOSPADM

## 2021-05-12 RX ADMIN — LIDOCAINE HYDROCHLORIDE AND EPINEPHRINE 3 ML: 15; 5 INJECTION, SOLUTION EPIDURAL at 19:20

## 2021-05-12 RX ADMIN — Medication 8 ML/HR: at 19:28

## 2021-05-12 RX ADMIN — OXYTOCIN-SODIUM CHLORIDE 0.9% IV SOLN 30 UNIT/500ML 650 ML/HR: 30-0.9/5 SOLUTION at 22:10

## 2021-05-12 RX ADMIN — SODIUM CHLORIDE 5 MILLION UNITS: 9 INJECTION, SOLUTION INTRAVENOUS at 19:30

## 2021-05-12 RX ADMIN — Medication 2 MILLI-UNITS/MIN: at 21:15

## 2021-05-12 RX ADMIN — SODIUM CHLORIDE, POTASSIUM CHLORIDE, SODIUM LACTATE AND CALCIUM CHLORIDE 125 ML/HR: 600; 310; 30; 20 INJECTION, SOLUTION INTRAVENOUS at 18:00

## 2021-05-12 RX ADMIN — HYDROXYZINE PAMOATE 25 MG: 25 CAPSULE ORAL at 17:17

## 2021-05-12 RX ADMIN — LIDOCAINE HYDROCHLORIDE 5 ML: 10 INJECTION, SOLUTION EPIDURAL; INFILTRATION; INTRACAUDAL at 18:55

## 2021-05-12 RX ADMIN — LIDOCAINE HYDROCHLORIDE 30 MG: 10 INJECTION, SOLUTION INFILTRATION; PERINEURAL at 19:10

## 2021-05-12 RX ADMIN — ACETAMINOPHEN 650 MG: 325 TABLET, FILM COATED ORAL at 23:12

## 2021-05-12 RX ADMIN — LIDOCAINE HYDROCHLORIDE 5 ML: 10 INJECTION, SOLUTION EPIDURAL; INFILTRATION; INTRACAUDAL at 22:10

## 2021-05-12 RX ADMIN — OXYTOCIN-SODIUM CHLORIDE 0.9% IV SOLN 30 UNIT/500ML 2 MILLI-UNITS/MIN: 30-0.9/5 SOLUTION at 21:15

## 2021-05-13 VITALS
SYSTOLIC BLOOD PRESSURE: 143 MMHG | HEART RATE: 83 BPM | RESPIRATION RATE: 20 BRPM | DIASTOLIC BLOOD PRESSURE: 94 MMHG | TEMPERATURE: 98.3 F | OXYGEN SATURATION: 98 %

## 2021-05-13 LAB
ANTI-LEA: NORMAL
BASOPHILS # BLD AUTO: 0.06 10*3/MM3 (ref 0–0.2)
BASOPHILS NFR BLD AUTO: 0.4 % (ref 0–1.5)
DEPRECATED RDW RBC AUTO: 41 FL (ref 37–54)
EOSINOPHIL # BLD AUTO: 0.11 10*3/MM3 (ref 0–0.4)
EOSINOPHIL NFR BLD AUTO: 0.8 % (ref 0.3–6.2)
ERYTHROCYTE [DISTWIDTH] IN BLOOD BY AUTOMATED COUNT: 14.5 % (ref 12.3–15.4)
HCT VFR BLD AUTO: 34.4 % (ref 34–46.6)
HGB BLD-MCNC: 11.4 G/DL (ref 12–15.9)
HOLD SPECIMEN: NORMAL
IMM GRANULOCYTES # BLD AUTO: 0.16 10*3/MM3 (ref 0–0.05)
IMM GRANULOCYTES NFR BLD AUTO: 1.2 % (ref 0–0.5)
LYMPHOCYTES # BLD AUTO: 2.17 10*3/MM3 (ref 0.7–3.1)
LYMPHOCYTES NFR BLD AUTO: 16.1 % (ref 19.6–45.3)
MCH RBC QN AUTO: 26.3 PG (ref 26.6–33)
MCHC RBC AUTO-ENTMCNC: 33.1 G/DL (ref 31.5–35.7)
MCV RBC AUTO: 79.3 FL (ref 79–97)
MONOCYTES # BLD AUTO: 0.57 10*3/MM3 (ref 0.1–0.9)
MONOCYTES NFR BLD AUTO: 4.2 % (ref 5–12)
NEUTROPHILS NFR BLD AUTO: 10.4 10*3/MM3 (ref 1.7–7)
NEUTROPHILS NFR BLD AUTO: 77.3 % (ref 42.7–76)
NRBC BLD AUTO-RTO: 0.1 /100 WBC (ref 0–0.2)
PLATELET # BLD AUTO: 422 10*3/MM3 (ref 140–450)
PMV BLD AUTO: 10 FL (ref 6–12)
RBC # BLD AUTO: 4.34 10*6/MM3 (ref 3.77–5.28)
WBC # BLD AUTO: 13.47 10*3/MM3 (ref 3.4–10.8)

## 2021-05-13 PROCEDURE — 0503F POSTPARTUM CARE VISIT: CPT | Performed by: OBSTETRICS & GYNECOLOGY

## 2021-05-13 PROCEDURE — 85025 COMPLETE CBC W/AUTO DIFF WBC: CPT | Performed by: OBSTETRICS & GYNECOLOGY

## 2021-05-13 RX ORDER — BISACODYL 10 MG
10 SUPPOSITORY, RECTAL RECTAL DAILY PRN
Status: DISCONTINUED | OUTPATIENT
Start: 2021-05-13 | End: 2021-05-13 | Stop reason: HOSPADM

## 2021-05-13 RX ORDER — ACETAMINOPHEN 325 MG/1
650 TABLET ORAL EVERY 4 HOURS PRN
Qty: 60 TABLET | Refills: 2 | Status: SHIPPED | OUTPATIENT
Start: 2021-05-13

## 2021-05-13 RX ORDER — LANOLIN 100 %
OINTMENT (GRAM) TOPICAL
Status: DISCONTINUED | OUTPATIENT
Start: 2021-05-13 | End: 2021-05-13 | Stop reason: HOSPADM

## 2021-05-13 RX ORDER — SODIUM CHLORIDE 0.9 % (FLUSH) 0.9 %
1-10 SYRINGE (ML) INJECTION AS NEEDED
Status: DISCONTINUED | OUTPATIENT
Start: 2021-05-13 | End: 2021-05-13 | Stop reason: HOSPADM

## 2021-05-13 RX ORDER — METHADONE HYDROCHLORIDE 10 MG/1
60 TABLET ORAL DAILY
Status: DISCONTINUED | OUTPATIENT
Start: 2021-05-13 | End: 2021-05-13 | Stop reason: HOSPADM

## 2021-05-13 RX ORDER — PRENATAL VIT/IRON FUM/FOLIC AC 27MG-0.8MG
1 TABLET ORAL DAILY
Status: DISCONTINUED | OUTPATIENT
Start: 2021-05-13 | End: 2021-05-13 | Stop reason: HOSPADM

## 2021-05-13 RX ORDER — DOCUSATE SODIUM 100 MG/1
100 CAPSULE, LIQUID FILLED ORAL 2 TIMES DAILY
Status: DISCONTINUED | OUTPATIENT
Start: 2021-05-13 | End: 2021-05-13 | Stop reason: HOSPADM

## 2021-05-13 RX ORDER — DIPHENHYDRAMINE HCL 25 MG
25 CAPSULE ORAL NIGHTLY PRN
Status: DISCONTINUED | OUTPATIENT
Start: 2021-05-13 | End: 2021-05-13 | Stop reason: HOSPADM

## 2021-05-13 RX ORDER — HYDROCORTISONE 25 MG/G
1 CREAM TOPICAL AS NEEDED
Status: DISCONTINUED | OUTPATIENT
Start: 2021-05-13 | End: 2021-05-13 | Stop reason: HOSPADM

## 2021-05-13 RX ORDER — METHADONE HYDROCHLORIDE 10 MG/1
60 TABLET ORAL DAILY
Status: DISCONTINUED | OUTPATIENT
Start: 2021-05-13 | End: 2021-05-13

## 2021-05-13 RX ADMIN — DOCUSATE SODIUM 100 MG: 100 CAPSULE, LIQUID FILLED ORAL at 11:00

## 2021-05-13 RX ADMIN — METHADONE HYDROCHLORIDE 60 MG: 10 TABLET ORAL at 10:58

## 2021-05-13 RX ADMIN — HYDROCODONE BITARTRATE AND ACETAMINOPHEN 1 TABLET: 7.5; 325 TABLET ORAL at 00:02

## 2021-05-13 RX ADMIN — PRENATAL VIT W/ FE FUMARATE-FA TAB 27-0.8 MG 1 TABLET: 27-0.8 TAB at 11:00

## 2021-05-13 NOTE — ANESTHESIA PREPROCEDURE EVALUATION
Anesthesia Evaluation     NPO Solid Status: > 8 hours  NPO Liquid Status: < 2 hours           Airway   Mallampati: I  TM distance: >3 FB  Neck ROM: full  No difficulty expected  Dental      Pulmonary - normal exam    breath sounds clear to auscultation  (+) a smoker Current Smoked day of surgery,   Cardiovascular     ECG reviewed  Rhythm: regular  Rate: abnormal      ROS comment: Date/Time: 12/10/2020 5:10 PM   Performed by: Jose R Villalobos PA-C   Authorized by: Jose R Villalobos PA-C   Interpreted by physician   Comparison: not compared with previous ECG   Previous ECG: no previous ECG available   Rhythm: sinus rhythm   Rate: normal   BPM: 74   ST Segments: ST segments normal   Clinical impression: normal ECG   Interpreted by Jose R Villalobos PA-C on 12/21/2020 11:54 AM        Neuro/Psych  (+) psychiatric history Anxiety and Depression,     GI/Hepatic/Renal/Endo    (+)   hepatitis C,     Musculoskeletal     Abdominal    Substance History   (+) drug use (methadone- opiate dependency hx)     OB/GYN    (+) Pregnant,         Other                        Anesthesia Plan    ASA 3     epidural       Anesthetic plan, all risks, benefits, and alternatives have been provided, discussed and informed consent has been obtained with: patient and sibling.

## 2021-05-13 NOTE — ANESTHESIA PROCEDURE NOTES
Labor Epidural    Pre-sedation assessment completed: 5/12/2021 6:52 PM    Patient reassessed immediately prior to procedure    Patient location during procedure: OB  Start Time: 5/12/2021 7:10 PM  Stop Time: 5/12/2021 7:20 PM  Performed By  CRNA: Alejandro Carmichael CRNA  Preanesthetic Checklist  Completed: patient identified, IV checked, site marked, risks and benefits discussed, surgical consent, monitors and equipment checked, pre-op evaluation and timeout performed  Prep:  Pt Position:sitting  Sterile Tech:gloves, cap, sterile barrier and mask  Prep:povidone-iodine 7.5% surgical scrub  Monitoring:continuous pulse oximetry, EKG and blood pressure monitoring  Epidural Block Procedure:  Approach:midline  Guidance:landmark technique and palpation technique  Location:L4-L5  Needle Type:Tuohy  Needle Gauge:17 G  Loss of Resistance Medium: saline  Loss of Resistance: 6cm  Cath Depth at skin:11 cm  Paresthesia: none  Aspiration:negative  Test Dose:negative  Med administered at 5/12/2021 7:20 PM  Number of Attempts: 1  Post Assessment:  Dressing:secured with tape and occlusive dressing applied  Pt Tolerance:patient tolerated the procedure well with no apparent complications  Complications:no

## 2021-05-13 NOTE — ANESTHESIA POSTPROCEDURE EVALUATION
Patient: Ana M He    Procedure Summary     Date: 05/12/21 Room / Location:     Anesthesia Start: 1852 Anesthesia Stop: 2201    Procedure: LABOR ANALGESIA Diagnosis:     Scheduled Providers:  Provider: Alejandro Carmichael CRNA    Anesthesia Type: epidural ASA Status: 3          Anesthesia Type: epidural    Vitals  Vitals Value Taken Time   /70 05/12/21 2134   Temp 98.5 °F (36.9 °C) 05/12/21 2101   Pulse 171 05/12/21 2142   Resp     SpO2 100 % 05/12/21 2142   Vitals shown include unvalidated device data.        Post Anesthesia Care and Evaluation    Patient location during evaluation: bedside  Level of consciousness: awake and awake and alert  Pain score: 0  Pain management: adequate  Airway patency: patent  Anesthetic complications: No anesthetic complications  PONV Status: none  Cardiovascular status: acceptable and hemodynamically stable  Respiratory status: acceptable and spontaneous ventilation  Hydration status: acceptable  Post Neuraxial Block status: No signs or symptoms of PDPH

## 2021-05-18 LAB
LAB AP CASE REPORT: NORMAL
PATH REPORT.FINAL DX SPEC: NORMAL

## 2021-05-19 LAB — REF LAB TEST RESULTS: NORMAL
